# Patient Record
Sex: MALE | Race: WHITE | NOT HISPANIC OR LATINO | URBAN - METROPOLITAN AREA
[De-identification: names, ages, dates, MRNs, and addresses within clinical notes are randomized per-mention and may not be internally consistent; named-entity substitution may affect disease eponyms.]

---

## 2020-01-16 ENCOUNTER — PRE VISIT (OUTPATIENT)
Dept: UROLOGY | Facility: CLINIC | Age: 25
End: 2020-01-16

## 2020-01-16 NOTE — TELEPHONE ENCOUNTER
MEDICAL RECORDS REQUEST   [facility]  Urology Clinic  [address] [place] [address]  PHONE: [phone]  Fax: [phone]        FUTURE VISIT INFORMATION                                                   Sai Buckner, : 1995 scheduled for future visit at [facility]    APPOINTMENT INFORMATION:    Date: 20 9:30AM     Provider:  Avis Connors RN    Reason for Visit/Diagnosis: Discharge and comfort while urinating     REFERRAL INFORMATION:    Referring provider:  N/A    Specialty: N/A    Referring providers clinic:  [facility]     Clinic contact number:     RECORDS REQUESTED FOR VISIT                                                     NOTES  STATUS/DETAILS   OFFICE NOTE from referring provider  in process   OFFICE NOTE from other specialist  in process   DISCHARGE SUMMARY from hospital  in process   DISCHARGE REPORT from the ER  in process   OPERATIVE REPORT  in process   MEDICATION LIST  in process     PRE-VISIT CHECKLIST      Record collection complete If no, please explain: Called and LM for [facility] to have them fax recs ASAP  1:11PM   Appointment appropriately scheduled           (right time/right provider) Yes   MyChart activation Yes   Questionnaire complete If no, please explain: In process      Action Sonia  9:09AM - 2nd VM left for referring Clinic    Action Taken Called and left a detailed message for [facility], awaiting call back on medical records request.       10:22AM  [facility] called Luis Antonio, states they need an MI signed by the pt to release recs. Called and LM for pt to return my call ASAP for me to e-mail MI to gather his recs- awaiting call back and will inform provider about recs status      11:30AM  Pt returned my call, e-mailed MI ([email or web address])  Pt stated he will sign and return to me ASAP      12:55PM  Received signed MI- fax to  [facility] to obtain recs ASAP        Completed by: Sonia Hall

## 2020-01-16 NOTE — TELEPHONE ENCOUNTER
Reason for Visit: Consult    Diagnosis: Discharge and discomfort while urinating    Orders/Procedures/Records: Records available    Contact Patient: N/A    Rooming Requirements: Nico Pires  01/16/20  4:55 PM

## 2020-01-17 ASSESSMENT — ENCOUNTER SYMPTOMS
DYSURIA: 1
EYE REDNESS: 1

## 2020-01-18 ENCOUNTER — OFFICE VISIT (OUTPATIENT)
Dept: UROLOGY | Facility: CLINIC | Age: 25
End: 2020-01-18
Payer: COMMERCIAL

## 2020-01-18 ENCOUNTER — PRE VISIT (OUTPATIENT)
Dept: UROLOGY | Facility: CLINIC | Age: 25
End: 2020-01-18

## 2020-01-18 VITALS
HEIGHT: 74 IN | DIASTOLIC BLOOD PRESSURE: 82 MMHG | BODY MASS INDEX: 21.82 KG/M2 | WEIGHT: 170 LBS | SYSTOLIC BLOOD PRESSURE: 148 MMHG | HEART RATE: 84 BPM

## 2020-01-18 DIAGNOSIS — R36.9 PENILE DISCHARGE: Primary | ICD-10-CM

## 2020-01-18 DIAGNOSIS — R30.0 DYSURIA: ICD-10-CM

## 2020-01-18 LAB
ALBUMIN UR-MCNC: NEGATIVE MG/DL
APPEARANCE UR: CLEAR
BILIRUB UR QL STRIP: NEGATIVE
COLOR UR AUTO: ABNORMAL
GLUCOSE UR STRIP-MCNC: NEGATIVE MG/DL
GRAM STN SPEC: NORMAL
GRAM STN SPEC: NORMAL
HGB UR QL STRIP: NEGATIVE
KETONES UR STRIP-MCNC: NEGATIVE MG/DL
LEUKOCYTE ESTERASE UR QL STRIP: NEGATIVE
Lab: NORMAL
MUCOUS THREADS #/AREA URNS LPF: PRESENT /LPF
NITRATE UR QL: NEGATIVE
PH UR STRIP: 7 PH (ref 5–7)
RBC #/AREA URNS AUTO: <1 /HPF (ref 0–2)
SOURCE: ABNORMAL
SP GR UR STRIP: 1.01 (ref 1–1.03)
SPECIMEN SOURCE: NORMAL
UROBILINOGEN UR STRIP-MCNC: 0 MG/DL (ref 0–2)
WBC #/AREA URNS AUTO: 4 /HPF (ref 0–5)

## 2020-01-18 PROCEDURE — 87205 SMEAR GRAM STAIN: CPT | Performed by: NURSE PRACTITIONER

## 2020-01-18 PROCEDURE — 87109 MYCOPLASMA: CPT | Mod: 91 | Performed by: NURSE PRACTITIONER

## 2020-01-18 PROCEDURE — 87077 CULTURE AEROBIC IDENTIFY: CPT | Performed by: NURSE PRACTITIONER

## 2020-01-18 PROCEDURE — 87109 MYCOPLASMA: CPT | Performed by: NURSE PRACTITIONER

## 2020-01-18 PROCEDURE — 87086 URINE CULTURE/COLONY COUNT: CPT | Performed by: NURSE PRACTITIONER

## 2020-01-18 PROCEDURE — 87070 CULTURE OTHR SPECIMN AEROBIC: CPT | Mod: XS | Performed by: NURSE PRACTITIONER

## 2020-01-18 RX ORDER — EMTRICITABINE AND TENOFOVIR DISOPROXIL FUMARATE 200; 300 MG/1; MG/1
1 TABLET, FILM COATED ORAL DAILY
COMMUNITY

## 2020-01-18 ASSESSMENT — PAIN SCALES - GENERAL: PAINLEVEL: NO PAIN (0)

## 2020-01-18 ASSESSMENT — MIFFLIN-ST. JEOR: SCORE: 1835.86

## 2020-01-18 NOTE — PROGRESS NOTES
Chief Complaint:   Discharge and discomfort while voiding         History of Present Illness:    Sai Buckner is a very pleasant 24 year old male who presents for evaluation of the above. He was previously evaluated at [facility] for his symptoms, but these records are unfortunately unavailable. He states that in mid-December, he had an unprotected sexual encounter with two men. He developed urinary symptoms the following day, including penile discharge and discomfort with voiding and ejaculation. He underwent STI testing at , and all were negative. He was, however, treated with azithromycin 1 gm. His symptoms did not resolve, and retesting was done for STIs, but was again negative. He was treated with Rocephin at that repeat visit, along with a 7-day course of doxycycline, which he finished last Friday.     Today, he states that he continues to be symptomatic, although states that these symptoms have improved somewhat over time. However, they are bothersome, and he is hoping to find a cause. He denies any associated symptoms, including hematuria, hematospermia, or fevers.          Past Medical History:     Past Medical History:   Diagnosis Date     Penile discharge 12/16/2019            Past Surgical History:   History reviewed. No pertinent surgical history.         Medications     Current Outpatient Medications   Medication     emtricitabine-tenofovir (TRUVADA) 200-300 MG per tablet     No current facility-administered medications for this visit.             Family History:     Family History   Problem Relation Age of Onset     Hypertension Father      Hypertension Sister             Social History:     Social History     Socioeconomic History     Marital status: Single     Spouse name: Not on file     Number of children: Not on file     Years of education: Not on file     Highest education level: Not on file   Occupational History     Not on file   Social Needs     Financial resource strain:  "Not on file     Food insecurity:     Worry: Not on file     Inability: Not on file     Transportation needs:     Medical: Not on file     Non-medical: Not on file   Tobacco Use     Smoking status: Never Smoker     Smokeless tobacco: Never Used   Substance and Sexual Activity     Alcohol use: Yes     Drug use: Not Currently     Types: Marijuana     Comment: Occasional marijuana     Sexual activity: Not Currently     Partners: Male     Birth control/protection: Condom, None   Lifestyle     Physical activity:     Days per week: Not on file     Minutes per session: Not on file     Stress: Not on file   Relationships     Social connections:     Talks on phone: Not on file     Gets together: Not on file     Attends Confucianism service: Not on file     Active member of club or organization: Not on file     Attends meetings of clubs or organizations: Not on file     Relationship status: Not on file     Intimate partner violence:     Fear of current or ex partner: Not on file     Emotionally abused: Not on file     Physically abused: Not on file     Forced sexual activity: Not on file   Other Topics Concern     Parent/sibling w/ CABG, MI or angioplasty before 65F 55M? No   Social History Narrative     Not on file            Allergies:   Cats and Seasonal allergies         Review of Systems:  From intake questionnaire   Negative 14 system review except as noted on HPI, nurse's note.         Physical Exam:   Patient is a 24 year old  male   Vitals: Blood pressure (!) 148/82, pulse 84, height 1.88 m (6' 2\"), weight 77.1 kg (170 lb).  General Appearance Adult: Alert, no acute distress, oriented  Lungs: no respiratory distress, or pursed lip breathing  Heart: No obvious jugular venous distension present  Abdomen: soft, nontender, no organomegaly or masses, Body mass index is 21.83 kg/m .  : penis, scrotum, testes normal         Assessment and Plan:     Assessment: 24 year old male with penile discharge and discomfort with voiding " and ejaculating following an unprotected sexual encounter last month. STI testing x2 previously negative at [facility], although he has been treated with azithromycin, ceftriaxone, and doxycycline. Symptoms have improved somewhat over time, but continue to be present and bothersome. No associated symptoms, including hematuria, hematospermia, or fevers.     Plan:  -Will send urine for repeat analysis, culture and testing for mycoplasma and ureaplasma.  -Will also culture urethral discharge to identify a specific pathogen.   -If the above testing is negative, could consider monitoring symptoms vs. trialing an additional course of empiric antibiotics.       Avis Connors, CNP  Department of Urology

## 2020-01-18 NOTE — LETTER
1/18/2020       RE: Sai Buckner  [address]  [address]  [facility] [place] [address]     Dear Colleague,    Thank you for referring your patient, Sai Buckner, to the [facility] UROLOG[facility] AND [facility] FOR PROSTATE AND UROLOGIC CANCERS at [facility]. Please see a copy of my visit note below.            Chief Complaint:   Discharge and discomfort while voiding         History of Present Illness:    Sai Buckner is a very pleasant 24 year old male who presents for evaluation of the above. He was previously evaluated at [facility] for his symptoms, but these records are unfortunately unavailable. He states that in mid-December, he had an unprotected sexual encounter with two men. He developed urinary symptoms the following day, including penile discharge and discomfort with voiding and ejaculation. He underwent STI testing at , and all were negative. He was, however, treated with azithromycin 1 gm. His symptoms did not resolve, and retesting was done for STIs, but was again negative. He was treated with Rocephin at that repeat visit, along with a 7-day course of doxycycline, which he finished last Friday.     Today, he states that he continues to be symptomatic, although states that these symptoms have improved somewhat over time. However, they are bothersome, and he is hoping to find a cause. He denies any associated symptoms, including hematuria, hematospermia, or fevers.          Past Medical History:     Past Medical History:   Diagnosis Date     Penile discharge 12/16/2019            Past Surgical History:   History reviewed. No pertinent surgical history.         Medications     Current Outpatient Medications   Medication     emtricitabine-tenofovir (TRUVADA) 200-300 MG per tablet     No current facility-administered medications for this visit.             Family History:     Family History   Problem Relation Age of Onset     Hypertension Father      Hypertension Sister   "           Social History:     Social History     Socioeconomic History     Marital status: Single     Spouse name: Not on file     Number of children: Not on file     Years of education: Not on file     Highest education level: Not on file   Occupational History     Not on file   Social Needs     Financial resource strain: Not on file     Food insecurity:     Worry: Not on file     Inability: Not on file     Transportation needs:     Medical: Not on file     Non-medical: Not on file   Tobacco Use     Smoking status: Never Smoker     Smokeless tobacco: Never Used   Substance and Sexual Activity     Alcohol use: Yes     Drug use: Not Currently     Types: Marijuana     Comment: Occasional marijuana     Sexual activity: Not Currently     Partners: Male     Birth control/protection: Condom, None   Lifestyle     Physical activity:     Days per week: Not on file     Minutes per session: Not on file     Stress: Not on file   Relationships     Social connections:     Talks on phone: Not on file     Gets together: Not on file     Attends Zoroastrianism service: Not on file     Active member of club or organization: Not on file     Attends meetings of clubs or organizations: Not on file     Relationship status: Not on file     Intimate partner violence:     Fear of current or ex partner: Not on file     Emotionally abused: Not on file     Physically abused: Not on file     Forced sexual activity: Not on file   Other Topics Concern     Parent/sibling w/ CABG, MI or angioplasty before 65F 55M? No   Social History Narrative     Not on file            Allergies:   Cats and Seasonal allergies         Review of Systems:  From intake questionnaire   Negative 14 system review except as noted on HPI, nurse's note.         Physical Exam:   Patient is a 24 year old  male   Vitals: Blood pressure (!) 148/82, pulse 84, height 1.88 m (6' 2\"), weight 77.1 kg (170 lb).  General Appearance Adult: Alert, no acute distress, oriented  Lungs: no " respiratory distress, or pursed lip breathing  Heart: No obvious jugular venous distension present  Abdomen: soft, nontender, no organomegaly or masses, Body mass index is 21.83 kg/m .  : penis, scrotum, testes normal         Assessment and Plan:     Assessment: 24 year old male with penile discharge and discomfort with voiding and ejaculating following an unprotected sexual encounter last month. STI testing x2 previously negative at [facility], although he has been treated with azithromycin, ceftriaxone, and doxycycline. Symptoms have improved somewhat over time, but continue to be present and bothersome. No associated symptoms, including hematuria, hematospermia, or fevers.     Plan:  -Will send urine for repeat analysis, culture and testing for mycoplasma and ureaplasma.  -Will also culture urethral discharge to identify a specific pathogen.   -If the above testing is negative, could consider monitoring symptoms vs. trialing an additional course of empiric antibiotics.       Avis Connors, CNP  Department of Urology

## 2020-01-18 NOTE — PATIENT INSTRUCTIONS
UROLOGY CLINIC VISIT PATIENT INSTRUCTIONS    1) We will send your urine for repeat analysis, culture, and test for atypical infection from mycoplasma and ureaplasma.   2) We will also send the culture swab for testing to see if we are able to isolate a specific pathogen to guide treatment.   3) I will be in contact with you in the next several days once results become available.     If you have any issues, questions or concerns in the meantime, do not hesitate to contact us at [phone] or via [employer].     It was a pleasure meeting with you today.  Thank you for allowing me and my team the privilege of caring for you today.  YOU are the reason we are here, and I truly hope we provided you with the excellent service you deserve.  Please let us know if there is anything else we can do for you so that we can be sure you are leaving completely satisfied with your care experience.    Avis Connors, CNP

## 2020-01-18 NOTE — NURSING NOTE
"Chief Complaint   Patient presents with     Consult     Discharge and discomfort while urinating       Blood pressure (!) 148/82, pulse 84, height 1.88 m (6' 2\"), weight 77.1 kg (170 lb). Body mass index is 21.83 kg/m .    There is no problem list on file for this patient.      Allergies   Allergen Reactions     Cats      Seasonal Allergies        Current Outpatient Medications   Medication Sig Dispense Refill     emtricitabine-tenofovir (TRUVADA) 200-300 MG per tablet Take 1 tablet by mouth daily         Social History     Tobacco Use     Smoking status: Never Smoker     Smokeless tobacco: Never Used   Substance Use Topics     Alcohol use: Yes     Drug use: Not Currently     Types: Marijuana     Comment: Occasional marijuana       KAYKAY Hurst  1/18/2020  9:29 AM     "

## 2020-01-19 LAB
BACTERIA SPEC CULT: NO GROWTH
Lab: NORMAL
SPECIMEN SOURCE: NORMAL

## 2020-01-21 LAB
BACTERIA SPEC CULT: ABNORMAL
BACTERIA SPEC CULT: ABNORMAL
Lab: ABNORMAL
SPECIMEN SOURCE: ABNORMAL

## 2020-01-22 DIAGNOSIS — R30.0 DYSURIA: Primary | ICD-10-CM

## 2020-01-22 RX ORDER — CIPROFLOXACIN 500 MG/1
500 TABLET, FILM COATED ORAL 2 TIMES DAILY
Qty: 14 TABLET | Refills: 0 | Status: SHIPPED | OUTPATIENT
Start: 2020-01-22 | End: 2020-01-29

## 2020-01-26 LAB
BACTERIA SPEC CULT: NORMAL
BACTERIA SPEC CULT: NORMAL
SPECIMEN SOURCE: NORMAL
SPECIMEN SOURCE: NORMAL

## 2020-02-10 ENCOUNTER — MYC MEDICAL ADVICE (OUTPATIENT)
Dept: UROLOGY | Facility: CLINIC | Age: 25
End: 2020-02-10

## 2020-02-10 DIAGNOSIS — N34.2 URETHRITIS: Primary | ICD-10-CM

## 2020-02-10 RX ORDER — METRONIDAZOLE 500 MG/1
500 TABLET ORAL 2 TIMES DAILY
Qty: 14 TABLET | Refills: 0 | Status: SHIPPED | OUTPATIENT
Start: 2020-02-10 | End: 2020-02-17

## 2020-02-24 ENCOUNTER — TELEPHONE (OUTPATIENT)
Dept: UROLOGY | Facility: CLINIC | Age: 25
End: 2020-02-24

## 2020-02-24 NOTE — TELEPHONE ENCOUNTER
Will wait and see how things go and make appointment if need be   Cristel Leone LPN Staff Nurse  ----- Message from Avis Connors CNP sent at 2/24/2020  8:12 AM CST -----  Leonardo Fam-    I think he needs to continue monitoring his symptoms and give this a little more time. I would otherwise recommend follow up with Dr. Husain moving forward to consider alternative testing/treatments.    Thanks!    Avis Connors CNP  ----- Message -----  From: Cristel Leone LPN  Sent: 2/24/2020   8:07 AM CST  To: Avis Connors CNP    Patient called and talked about his symptoms again.  He is having just one small drop of discharge in the morning but feels this is not  normal  --sometimes sees discharge in pajamas  no pain at this time.  What next  Cristel Leone LPN Staff Nurse

## 2020-03-04 ENCOUNTER — TELEPHONE (OUTPATIENT)
Dept: UROLOGY | Facility: CLINIC | Age: 25
End: 2020-03-04

## 2020-03-04 NOTE — TELEPHONE ENCOUNTER
[facility] Call Center    Phone Message    May a detailed message be left on voicemail: yes     Reason for Call: Symptoms or Concerns     If patient has red-flag symptoms, warm transfer to triage line    Current symptom or concern: Penile discharge (severity level), burning during urination    Symptoms have been present for:  Last few day(s)       Are there any new or worsening symptoms? Yes: Pt stated the last few days has gotten worse      Action Taken: Message routed to:  [facility]    Travel Screening: Not Applicable

## 2020-03-05 ENCOUNTER — APPOINTMENT (OUTPATIENT)
Dept: LAB | Facility: CLINIC | Age: 25
End: 2020-03-05
Payer: COMMERCIAL

## 2020-03-05 ENCOUNTER — OFFICE VISIT (OUTPATIENT)
Dept: UROLOGY | Facility: CLINIC | Age: 25
End: 2020-03-05
Payer: COMMERCIAL

## 2020-03-05 VITALS
DIASTOLIC BLOOD PRESSURE: 91 MMHG | HEART RATE: 86 BPM | SYSTOLIC BLOOD PRESSURE: 147 MMHG | BODY MASS INDEX: 21.82 KG/M2 | WEIGHT: 170 LBS | HEIGHT: 74 IN

## 2020-03-05 DIAGNOSIS — R36.9 PENILE DISCHARGE: Primary | ICD-10-CM

## 2020-03-05 PROCEDURE — 87591 N.GONORRHOEAE DNA AMP PROB: CPT | Performed by: UROLOGY

## 2020-03-05 PROCEDURE — 87389 HIV-1 AG W/HIV-1&-2 AB AG IA: CPT | Performed by: UROLOGY

## 2020-03-05 PROCEDURE — 87491 CHLMYD TRACH DNA AMP PROBE: CPT | Performed by: UROLOGY

## 2020-03-05 PROCEDURE — 86780 TREPONEMA PALLIDUM: CPT | Performed by: UROLOGY

## 2020-03-05 PROCEDURE — 87086 URINE CULTURE/COLONY COUNT: CPT | Performed by: UROLOGY

## 2020-03-05 RX ORDER — AZITHROMYCIN 500 MG/1
500 TABLET, FILM COATED ORAL DAILY
Qty: 2 TABLET | Refills: 0 | Status: SHIPPED | OUTPATIENT
Start: 2020-03-05 | End: 2020-03-07

## 2020-03-05 RX ORDER — CEFTRIAXONE SODIUM 250 MG
250 VIAL (EA) INJECTION ONCE
Status: DISCONTINUED | OUTPATIENT
Start: 2020-03-05 | End: 2020-03-05 | Stop reason: CLARIF

## 2020-03-05 RX ORDER — CEFTRIAXONE SODIUM 250 MG
250 VIAL (EA) INJECTION ONCE
Status: COMPLETED | OUTPATIENT
Start: 2020-03-05 | End: 2020-03-05

## 2020-03-05 RX ADMIN — Medication 250 MG: at 11:50

## 2020-03-05 ASSESSMENT — MIFFLIN-ST. JEOR: SCORE: 1835.86

## 2020-03-05 ASSESSMENT — PAIN SCALES - GENERAL: PAINLEVEL: NO PAIN (0)

## 2020-03-05 NOTE — PROGRESS NOTES
We are pleased to see . Sai Buckner in consultation at the request of Avis Connors for the evaluation of chief complaint listed below    CC:    Penile discharge         History of Present Illness:   HPI: Sai Buckner is a(n) 24 year old MALE presenting to discuss penile discharge.     He was previously seen by Avis Connors CNP, for similar symptoms.    In December he had dysuria after unprotected sex. [facility] in January at [facility]. Had abx prescribed for both. He continued to have 2 symptoms: penile discharge and dysuria. These were mild until 3 days ago, at which time he started to have significantly more discharge and worsening dysuria. Has continued to have sexual encounters, usually with protection but 2 Saturdays ago had unprotected anal sex again. This partner is HIV positive but takes medication for this so his viral count is undetectable. Patient is also on Truvada. Has had anal sex with 2 partners since December. One was protected, another was not. The tip of his penis is also very sensitive. Urine not cloudy or malodorous. No urinary frequency. No blood in his urine. No fevers, chills, nausea, vomiting.            Past Medical History:     Past Medical History:   Diagnosis Date     Penile discharge 12/16/2019            Past Surgical History:   History reviewed. No pertinent surgical history.         Social History:   Works as an  at [employer].        Smoking: no tobacco  Alcohol: Social  IV Drug Use: None         Family History:     Family History   Problem Relation Age of Onset     Hypertension Father      Hypertension Sister      No urologic cancers in the family.         Allergies:     Allergies   Allergen Reactions     Cats      Seasonal Allergies             Medications:     Current Outpatient Medications   Medication Sig     emtricitabine-tenofovir (TRUVADA) 200-300 MG per tablet Take 1 tablet by mouth daily     No current  "facility-administered medications for this visit.             REVIEW OF SYSTEMS:    See HPI for pertinent details.  Remainder of 10-point ROS negative.         PHYSICAL EXAM   BP (!) 147/91   Pulse 86   Ht 1.88 m (6' 2\")   Wt 77.1 kg (170 lb)   BMI 21.83 kg/m    GENERAL: No acute distress. Well nourished.   HEENT:  Sclerae anicteric.  Conjunctivae pink.  Moist mucous membranes.  NECK:  Supple.  No lymphadenopathy.  CARDIAC:  Regular rate and rhythm.  LUNGS:  Non-labored breathing  BACK:  No costovertebral tenderness  ABDOMEN: Soft, non-tender, no surgical scars, no organomegaly, non-tender.  :  Phallus circumcised, meatus adequate, active mucopurulent discharge from urethral meatus present. Testes descended bilaterally, no intratesticular masses.  Epididymes non-tender.  No varicoceles or inguinal hernias.  EXTREMITIES:  Warm, well perfused.  No lower extremity edema bilaterally.  NEURO: normal gait, no focal deficits.           LABS AND IMAGING:   Recent STD panel negative          ASSESSMENT:   Sai Buckner is a 24 year old male who presents for evaluation of penile discharge. Based on sexual history it seems likely he has an STD. Has had previous rounds of negative STD panels however new partner with whom he had unprotected sexual intercourse with has unknown STD status and is HIV positive with unknown viral load. Symptoms worsened after this encounter. Suspect STD.          PLAN:   # Penile discharge, likely STD  - Repeat STD panel today  - 250 mg IM ceftriaxone in clinic today  - 1g azithromycin PO to be picked up at pharmacy  - RTC 2 weeks with repeat labs   - ID consult   - Follow up JORGITO Galvez MD  Urology Resident    Patient was seen and examined with Dr. Husain      I saw and examined the patient with the resident today.  I agree with the resident note and plan of care as above.   Gc/Chlam both came back positive.  Pt contacted to notify of diagnosis, advise safer sex, advised him to " notify sexual partners.    Shaw Husain MD  Urology Staff

## 2020-03-05 NOTE — LETTER
3/5/2020       RE: Sai Buckner  [address]  [address]  [place] [address]     Dear Colleague,    Thank you for referring your patient, Sai Buckner, to the [facility] UROLOGY AND [facility] FOR PROSTATE AND UROLOGIC CANCERS at [facility]. Please see a copy of my visit note below.    We are pleased to see . Sai Buckner in consultation at the request of Avis Connors for the evaluation of chief complaint listed below    CC:    Penile discharge         History of Present Illness:   HPI: Sai Buckner is a(n) 24 year old MALE presenting to discuss penile discharge.     He was previously seen by Avis Connors CNP, for similar symptoms.    In December he had dysuria after unprotected sex[facility] in January at [facility]. Had abx prescribed for both. He continued to have 2 symptoms: penile discharge and dysuria. These were mild until 3 days ago, at which time he started to have significantly more discharge and worsening dysuria. Has continued to have sexual encounters, usually with protection but 2 Saturdays ago had unprotected anal sex again. This partner is HIV positive but takes medication for this so his viral count is undetectable. Patient is also on Truvada. Has had anal sex with 2 partners since December. One was protected, another was not. The tip of his penis is also very sensitive. Urine not cloudy or malodorous. No urinary frequency. No blood in his urine. No fevers, chills, nausea, vomiting.            Past Medical History:     Past Medical History:   Diagnosis Date     Penile discharge 12/16/2019            Past Surgical History:   History reviewed. No pertinent surgical history.         Social History:   Works as an  at [employer].        Smoking: no tobacco  Alcohol: Social  IV Drug Use: None         Family History:     Family History   Problem Relation Age of Onset     Hypertension Father      Hypertension  "Sister      No urologic cancers in the family.         Allergies:     Allergies   Allergen Reactions     Cats      Seasonal Allergies             Medications:     Current Outpatient Medications   Medication Sig     emtricitabine-tenofovir (TRUVADA) 200-300 MG per tablet Take 1 tablet by mouth daily     No current facility-administered medications for this visit.             REVIEW OF SYSTEMS:    See HPI for pertinent details.  Remainder of 10-point ROS negative.         PHYSICAL EXAM   BP (!) 147/91   Pulse 86   Ht 1.88 m (6' 2\")   Wt 77.1 kg (170 lb)   BMI 21.83 kg/m    GENERAL: No acute distress. Well nourished.   HEENT:  Sclerae anicteric.  Conjunctivae pink.  Moist mucous membranes.  NECK:  Supple.  No lymphadenopathy.  CARDIAC:  Regular rate and rhythm.  LUNGS:  Non-labored breathing  BACK:  No costovertebral tenderness  ABDOMEN: Soft, non-tender, no surgical scars, no organomegaly, non-tender.  :  Phallus circumcised, meatus adequate, active mucopurulent discharge from urethral meatus present. Testes descended bilaterally, no intratesticular masses.  Epididymes non-tender.  No varicoceles or inguinal hernias.  EXTREMITIES:  Warm, well perfused.  No lower extremity edema bilaterally.  NEURO: normal gait, no focal deficits.           LABS AND IMAGING:   Recent STD panel negative          ASSESSMENT:   Sai Buckner is a 24 year old male who presents for evaluation of penile discharge. Based on sexual history it seems likely he has an STD. Has had previous rounds of negative STD panels however new partner with whom he had unprotected sexual intercourse with has unknown STD status and is HIV positive with unknown viral load. Symptoms worsened after this encounter. Suspect STD.          PLAN:   # Penile discharge, likely STD  - Repeat STD panel today  - 250 mg IM ceftriaxone in clinic today  - 1g azithromycin PO to be picked up at pharmacy  - RTC 2 weeks with repeat labs   - ID consult   - Follow up " JORGITO Galvez MD  Urology Resident    Patient was seen and examined with Dr. Husain      I saw and examined the patient with the resident today.  I agree with the resident note and plan of care as above.   Gc/Chlam both came back positive.  Pt contacted to notify of diagnosis, advise safer sex, advised him to notify sexual partners.    Shaw Husain MD  Urology Staff

## 2020-03-05 NOTE — NURSING NOTE
"Chief Complaint   Patient presents with     Consult     Penile discharge, referred from Avis Connors CNP       Blood pressure (!) 147/91, pulse 86, height 1.88 m (6' 2\"), weight 77.1 kg (170 lb). Body mass index is 21.83 kg/m .    There is no problem list on file for this patient.      Allergies   Allergen Reactions     Cats      Seasonal Allergies        Current Outpatient Medications   Medication Sig Dispense Refill     emtricitabine-tenofovir (TRUVADA) 200-300 MG per tablet Take 1 tablet by mouth daily         Social History     Tobacco Use     Smoking status: Never Smoker     Smokeless tobacco: Never Used   Substance Use Topics     Alcohol use: Yes     Drug use: Not Currently     Types: Marijuana     Comment: Occasional marijuana       Santa Bhatti LPN  3/5/2020  9:54 AM    The following medication was given:     MEDICATION:  Rocephin 250 mg and Lidocaine 0.9 mL  ROUTE: IM  SITE: Ventrogluteal - Right  DOSE: 250 mg  LOT #: [id number]  : [email or web address]  EXPIRATION DATE: 10/2021  NDC#: 3454-7683-87   Was there drug waste? No    Prior to injection, verified patient identity using patient's name and date of birth.  Due to injection administration, patient instructed to remain in clinic for 15 minutes  afterwards, and to report any adverse reaction to me immediately.    Drug Amount Wasted:  None.  Vial/Syringe: Single dose vial      The following medication was given:     MEDICATION:  Lidocaine without epinephrine 1%  ROUTE: IM (reconstitute Rocephin)  SITE: Ventrogluteal - Right  DOSE: 0.9 mL  LOT #: [id number]  : [employer]  EXPIRATION DATE: 06/2022  NDC#: [address]-75   Was there drug waste? Yes  Amount of drug waste (mL): 1.1 mL.  Reason for waste:  Single use vial    Prior to injection, verified patient identity using patient's name and date of birth.  Due to injection administration, patient instructed to remain in clinic for 15 minutes  afterwards, and to report any adverse reaction to me " immediately.    Drug Amount Wasted:  Yes: 1.1 mL   Vial/Syringe: Single dose vial    Santa Bhatti LPN  March 5, 2020  11:34 AM

## 2020-03-05 NOTE — PATIENT INSTRUCTIONS
Labs today.  Referral to Infectious Disease.  Follow up for repeat labs in a couple weeks.      It was a pleasure meeting with you today.  Thank you for allowing me and my team the privilege of caring for you today.  YOU are the reason we are here, and I truly hope we provided you with the excellent service you deserve.  Please let us know if there is anything else we can do for you so that we can be sure you are leaving completely satisfied with your care experience.

## 2020-03-06 ENCOUNTER — TELEPHONE (OUTPATIENT)
Dept: UROLOGY | Facility: CLINIC | Age: 25
End: 2020-03-06

## 2020-03-06 NOTE — TELEPHONE ENCOUNTER
Hi all,      Sandy from [facility] lab called stating that the patient's chlamydia and gonorrhea labs are positive. Please notify the patient with results and treatment plan.      Thanks, Steven Wisdom MA

## 2020-03-11 ENCOUNTER — HEALTH MAINTENANCE LETTER (OUTPATIENT)
Age: 25
End: 2020-03-11

## 2020-03-11 NOTE — TELEPHONE ENCOUNTER
Hi all,      FYI-Patient was notified that he needs to repeat his labs next week and have a consult with [facility]. Patient states that he found another to continue his care.       Steven Wisdom, [facility]

## 2020-03-11 NOTE — TELEPHONE ENCOUNTER
[facility]    Phone Message    May a detailed message be left on voicemail: yes     Reason for Call: Other: pt calling because he have not recieved the call to discuss next steps based apon his results. Please call the pt back to discuss next steps.     Action Taken: Message routed to:  [facility] urology    Travel Screening: Not Applicable

## 2020-08-02 ENCOUNTER — VIRTUAL VISIT (OUTPATIENT)
Dept: FAMILY MEDICINE | Facility: OTHER | Age: 25
End: 2020-08-02

## 2020-08-02 ENCOUNTER — AMBULATORY - HEALTHEAST (OUTPATIENT)
Dept: FAMILY MEDICINE | Facility: CLINIC | Age: 25
End: 2020-08-02

## 2020-08-02 DIAGNOSIS — Z20.822 SUSPECTED COVID-19 VIRUS INFECTION: ICD-10-CM

## 2020-08-02 NOTE — PROGRESS NOTES
"Date: 2020 07:28:06  Clinician: Shyanne Branch  Clinician NPI: [id number]  Patient: Sai Buckner  Patient : 1995  Patient Address: [address] [place] [address]  Patient Phone: [phone]  Visit Protocol: URI  Patient Summary:  Sai is a 25 year old ( : 1995 ) male who initiated a Visit for COVID-19 (Coronavirus) evaluation and screening. When asked the question \"Please sign me up to receive news, health information and promotions. \", Sai responded \"No\".    Sai states his symptoms started 1-2 days ago.   His symptoms consist of a sore throat, nasal congestion, and malaise.   Symptom details     Nasal secretions: The color of his mucus is green.    Sore throat: Sai reports having mild throat pain (1-3 on a 10 point pain scale), does not have exudate on his tonsils, and can swallow liquids. The lymph nodes in his neck are not enlarged. A rash has not appeared on the skin since the sore throat started.      Sai denies having wheezing, nausea, teeth pain, ageusia, diarrhea, myalgias, anosmia, facial pain or pressure, fever, cough, vomiting, rhinitis, ear pain, headache, chills, and enlarged lymph nodes. He also denies having recent facial or sinus surgery in the past 60 days and taking antibiotic medication in the past month. He is not experiencing dyspnea.   Precipitating events  Sai is not sure if he has been exposed to someone with strep throat.   Pertinent COVID-19 (Coronavirus) information  In the past 14 days, Sai has not worked in a congregate living setting.   He does not work or volunteer as healthcare worker or a  and does not work or volunteer in a healthcare facility.   Sai also has not lived in a congregate living setting in the past 14 days. He does not live with a healthcare worker.   Sai has not had a close contact with a laboratory-confirmed COVID-19 patient within 14 days of symptom onset.   Pertinent medical history  Sai needs a " "return to work/school note.   Weight: 170 lbs   Sai does not smoke or use smokeless tobacco.   Weight: 170 lbs    MEDICATIONS: Truvada oral, ALLERGIES: NKDA  Clinician Response:  Dear Sai,   Your symptoms show that you may have coronavirus (COVID-19). This illness can cause fever, cough and trouble breathing. Many people get a mild case and get better on their own. Some people can get very sick.  What should I do?  We would like to test you for this virus.   1. Please call [phone] to schedule your visit. Explain that [facility]-19 test. Be ready to share your [facility] visit ID number.  The following will serve as your written order for this COVID Test, ordered by me, for the indication of suspected COVID [Z20.828]: The test will be ordered in [employer], our electronic health record, after you are scheduled. It will show as ordered and authorized by Servando Madrid MD.  Order: COVID-19 (Coronavirus) PCR for SYMPTOMATIC testing from [facility].      2. When it's time for your COVID test:  Stay at least 6 feet away from others. (If someone will drive you to your test, stay in the backseat, as far away from the  as you can.)   Cover your mouth and nose with a mask, tissue or washcloth.  Go straight to the testing site. Don't make any stops on the way there or back.      3.Starting now: Stay home and away from others (self-isolate) until:   You've had no fever---and no medicine that reduces fever---for 3 full days (72 hours). And...   Your other symptoms have gotten better. For example, your cough or breathing has improved. And...   At least 10 days have passed since your symptoms started.       During this time, don't leave the house except for testing or medical care.   Stay in your own room, even for meals. Use your own bathroom if you can.   Stay away from others in your home. No hugging, kissing or shaking hands. No visitors.  Don't go to work, school or anywhere else.    Clean \"high " "touch\" surfaces often (doorknobs, counters, handles, etc.). Use a household cleaning spray or wipes. You'll find a full list of  on the EPA website: www.epa.gov/pesticide-registration/list-n-disinfectants-use-against-sars-cov-2.   Cover your mouth and nose with a mask, tissue or washcloth to avoid spreading germs.  Wash your hands and face often. Use soap and water.  Caregivers in these groups are at risk for severe illness due to COVID-19:  o People 65 years and older  o People who live in a nursing home or long-term care facility  o People with chronic disease (lung, heart, cancer, diabetes, kidney, liver, immunologic)  o People who have a weakened immune system, including those who:   Are in cancer treatment  Take medicine that weakens the immune system, such as corticosteroids  Had a bone marrow or organ transplant  Have an immune deficiency  Have poorly controlled HIV or AIDS  Are obese (body mass index of 40 or higher)  Smoke regularly   o Caregivers should wear gloves while washing dishes, handling laundry and cleaning bedrooms and bathrooms.  o Use caution when washing and drying laundry: Don't shake dirty laundry, and use the warmest water setting that you can.  o For more tips, go to www.cdc.gov/coronavirus/2019-ncov/downloads/10Things.pdf.    4.Sign up for [employer] We know it's scary to hear that you might have COVID-19. We want to track your symptoms to make sure you're okay over the next 2 weeks. Please look for an email from [employer]---this is a free, online program that we'll use to keep in touch. To sign up, follow the link in the email. Learn more at [email or web address]  How can I take care of myself?   Get lots of rest. Drink extra fluids (unless a doctor has told you not to).   Take Tylenol (acetaminophen) for fever or pain. If you have liver or kidney problems, ask your family doctor if it's okay to take Tylenol.   Adults can take either:    650 mg (two 325 mg " pills) every 4 to 6 hours, or...   1,000 mg (two 500 mg pills) every 8 hours as needed.    Note: Don't take more than 3,000 mg in one day. Acetaminophen is found in many medicines (both prescribed and over-the-counter medicines). Read all labels to be sure you don't take too much.   For children, check the Tylenol bottle for the right dose. The dose is based on the child's age or weight.    If you have other health problems (like cancer, heart failure, an organ transplant or severe kidney disease): Call your specialty clinic if you don't feel better in the next 2 days.       Know when to call 911. Emergency warning signs include:    Trouble breathing or shortness of breath Pain or pressure in the chest that doesn't go away Feeling confused like you haven't felt before, or not being able to wake up Bluish-colored lips or face.  Where can I get more information?   [facility] -- About COVID-19: [email or web address]/   CDC -- What to Do If You're Sick: www.cdc.gov/coronavirus/2019-ncov/about/steps-when-sick.html   CDC -- Ending Home Isolation: www.cdc.gov/coronavirus/2019-ncov/hcp/disposition-in-home-patients.html   CDC -- Caring for Someone: www.cdc.gov/coronavirus/2019-ncov/if-you-are-sick/care-for-someone.html   [facility] -- Interim Guidance for Hospital Discharge to Home: www.health.[facility].mn.us/diseases/coronavirus/hcp/hospdischarge.pdf   [facility] clinical trials (COVID-19 research studies): clinicalaffairs.[facility]/n-clinical-trials    Below are the COVID-19 hotlines at the [place] Department of Health ([facility]). Interpreters are available.    For health questions: Call 094-977-1704 or 1-614.152.6087 (7 a.m. to 7 p.m.) For questions about schools and childcare: Call 418-693-2719 or 1-500.484.1102 (7 a.m. to 7 p.m.)    Diagnosis: Cough  Diagnosis ICD: R05

## 2020-08-04 ENCOUNTER — COMMUNICATION - HEALTHEAST (OUTPATIENT)
Dept: SCHEDULING | Facility: CLINIC | Age: 25
End: 2020-08-04

## 2020-09-17 ENCOUNTER — VIRTUAL VISIT (OUTPATIENT)
Dept: FAMILY MEDICINE | Facility: OTHER | Age: 25
End: 2020-09-17

## 2020-09-17 DIAGNOSIS — Z20.822 SUSPECTED 2019 NOVEL CORONAVIRUS INFECTION: Primary | ICD-10-CM

## 2020-09-17 NOTE — PROGRESS NOTES
"Date: 2020 07:39:51  Clinician: Rajat Mccauley  Clinician NPI: [id number]  Patient: Sai Buckner  Patient : 1995  Patient Address: [address] [place] [address]  Patient Phone: [phone]  Visit Protocol: URI  Patient Summary:  Sai is a 25 year old ( : 1995 ) male who initiated a [facility] Visit for COVID-19 (Coronavirus) evaluation and screening. When asked the question \"Please sign me up to receive news, health information and promotions. \", Sai responded \"No\".    When asked when his symptoms started, Sai reported that he does not have any symptoms.   He denies taking antibiotic medication in the past month and having recent facial or sinus surgery in the past 60 days.    Pertinent COVID-19 (Coronavirus) information  In the past 14 days, Sai has not worked in a congregate living setting.   He does not work or volunteer as healthcare worker or a  and does not work or volunteer in a healthcare facility.   Sai also has not lived in a congregate living setting in the past 14 days. He does not live with a healthcare worker.   Sai has had a close contact with a laboratory-confirmed COVID-19 patient in the last 14 days. Additional information about contact with COVID-19 (Coronavirus) patient as reported by the patient (free text): The contact (my friend) started exhibiting symptoms on September 15.  I was then with the contact inside their home for several hours on , and they also got tested that day.  On the morning of , they received a laboratory-confirmed COVID-19 \"detected\" result.   Patient reported they are not living in the same household with a COVID-19 positive patient.  Patient was in an enclosed space for greater than 15 minutes with a COVID-19 patient.  Since 2019, Sai and has had upper respiratory infection (URI) or influenza-like illness. Has not been diagnosed with lab-confirmed COVID-19 test      Date(s) of " previous URI or influenza-like illness (free-text): Early August 2020.     Symptoms Sai experienced during previous URI or influenza-like illness as reported by the patient (free-text): Sore throat, fatigue, and congestion.        Pertinent medical history  Sai needs a return to work/school note.   Weight: 175 lbs   Sai does not smoke or use smokeless tobacco.   Additional information as reported by the patient (free text): To be clear, I am not currently exhibiting symptoms.   Weight: 175 lbs    MEDICATIONS: Truvada oral, ALLERGIES: NKDA  Clinician Response:  Dear Sai,   Based on your exposure to COVID-19 (coronavirus), we would like to test you for this virus.  1. Please call [phone] to schedule your visit. Explain that you were referred by [facility] to have a COVID-19 test. Be ready to share your [facility] visit ID number.  The following will serve as your written order for this COVID Test, ordered by me, for the indication of suspected COVID [Z20.828]: The test will be ordered in [employer], our electronic health record, after you are scheduled. It will show as ordered and authorized by Servando Madrid MD.  Order: COVID-19 (coronavirus) PCR for ASYMPTOMATIC EXPOSURE testing from [facility].  If you know you have had close contact with someone who tested positive, you should be quarantined for 14 days after this exposure. You should stay in quarantine for the14 days even if the covid test is negative, the optimal time to test after exposure is 5-7 days from the exposure  Quarantine means   What should I do?  For safety, it's very important to follow these rules. Do this for 14 days after the date you were last exposed to the virus..  Stay home and away from others. Don't go to school or anywhere else. Generally quarantine means staying home from work but there are some exceptions to this. Please contact your workplace.   No hugging, kissing or shaking hands.  Don't let anyone visit.  Cover your mouth and nose with a  mask, tissue or washcloth to avoid spreading germs.  Wash your hands and face often. Use soap and water.  What are the symptoms of COVID-19?  The most common symptoms are cough, fever and trouble breathing. Less common symptoms include headache, body aches, fatigue (feeling very tired), chills, sore throat, stuffy or runny nose, diarrhea (loose poop), loss of taste or smell, belly pain, and nausea or vomiting (feeling sick to your stomach or throwing up).  After 14 days, if you have still don't have symptoms, you likely don't have this virus.  If you develop symptoms, follow these guidelines.  If you're normally healthy: Please start another [facility] visit to report your symptoms. Go to [email or web address].  If you have a serious health problem (like cancer, heart failure, an organ transplant or kidney disease): Call your specialty clinic. Let them know that you might have COVID-19.  2. When it's time for your COVID test:  Stay at least 6 feet away from others. (If someone will drive you to your test, stay in the backseat, as far away from the  as you can.)  Cover your mouth and nose with a mask, tissue or washcloth.  Go straight to the testing site. Don't make any stops on the way there or back.  Please note  Caregivers in these groups are at risk for severe illness due to COVID-19:  o People 65 years and older  o People who live in a nursing home or long-term care facility  o People with chronic disease (lung, heart, cancer, diabetes, kidney, liver, immunologic)  o People who have a weakened immune system, including those who:  Are in cancer treatment  Take medicine that weakens the immune system, such as corticosteroids  Had a bone marrow or organ transplant  Have an immune deficiency  Have poorly controlled HIV or AIDS  Are obese (body mass index of 40 or higher)  Smoke regularly  Where can I get more information?   [employer] -- About COVID-19: [email or web address].[email or web address] [email or web address]  CDC -- What to Do If You're  Sick: www.cdc.gov/coronavirus/2019-ncov/about/steps-when-sick.html  CDC -- Ending Home Isolation: www.cdc.gov/coronavirus/2019-ncov/hcp/disposition-in-home-patients.html  [facility] -- Caring for Someone: www.cdc.gov/coronavirus/2019-ncov/if-you-are-sick/care-for-someone.html  [facility] -- Interim Guidance for Hospital Discharge to Home: www.[facility].mn./diseases/coronavirus/hcp/hospdischarge.pdf  [facility] clinical trials (COVID-19 research studies): clinicalaffairs.[facility]-clinical-trials  Below are the COVID-19 hotlines at the [place] Department of Health ([facility]). Interpreters are available.  For health questions: Call 853-806-4293 or 1-614.719.5220 (7 a.m. to 7 p.m.)  For questions about schools and childcare: Call 889-787-8633 or 1-307.755.9097 (7 a.m. to 7 p.m.)    Diagnosis: Contact with and (suspected) exposure to other viral communicable diseases  Diagnosis ICD: Z20.828

## 2020-09-19 DIAGNOSIS — Z20.822 SUSPECTED 2019 NOVEL CORONAVIRUS INFECTION: ICD-10-CM

## 2020-09-19 PROCEDURE — U0003 INFECTIOUS AGENT DETECTION BY NUCLEIC ACID (DNA OR RNA); SEVERE ACUTE RESPIRATORY SYNDROME CORONAVIRUS 2 (SARS-COV-2) (CORONAVIRUS DISEASE [COVID-19]), AMPLIFIED PROBE TECHNIQUE, MAKING USE OF HIGH THROUGHPUT TECHNOLOGIES AS DESCRIBED BY CMS-2020-01-R: HCPCS | Performed by: FAMILY MEDICINE

## 2020-09-20 LAB
SARS-COV-2 RNA SPEC QL NAA+PROBE: NOT DETECTED
SPECIMEN SOURCE: NORMAL

## 2020-11-27 ENCOUNTER — VIRTUAL VISIT (OUTPATIENT)
Dept: FAMILY MEDICINE | Facility: OTHER | Age: 25
End: 2020-11-27

## 2020-11-27 NOTE — PROGRESS NOTES
"Date: 2020 15:01:41  Clinician: Calos Portillo  Clinician NPI: [id number]  Patient: Sai Buckner  Patient : 1995  Patient Address: [address] [place] [address]  Patient Phone: [phone]  Visit Protocol: URI  Patient Summary:  Sai is a 25 year old ( : 1995 ) male who initiated a [facility] Visit for COVID-19 (Coronavirus) evaluation and screening. When asked the question \"Please sign me up to receive news, health information and promotions. \", Sai responded \"No\".    Sai states his symptoms started 1-2 days ago.   His symptoms consist of malaise and a sore throat.   Symptom details   Sore throat: Sai reports having mild throat pain (1-3 on a 10 point pain scale), does not have exudate on his tonsils, and can swallow liquids. The lymph nodes in his neck are not enlarged. A rash has not appeared on the skin since the sore throat started.    Sai denies having ear pain, headache, wheezing, fever, enlarged lymph nodes, cough, nasal congestion, nausea, vomiting, rhinitis, facial pain or pressure, myalgias, chills, teeth pain, ageusia, diarrhea, and anosmia. He also denies taking antibiotic medication in the past month and having recent facial or sinus surgery in the past 60 days. He is not experiencing dyspnea.   Precipitating events  Within the past week, Sai has not been exposed to someone with strep throat.   Pertinent COVID-19 (Coronavirus) information  Sai does not work or volunteer as healthcare worker or a . In the past 14 days, Sai has not worked or volunteered at a healthcare facility or group living setting.   In the past 14 days, he also has not lived in a congregate living setting.   Sai has not had a close contact with a laboratory-confirmed COVID-19 patient within 14 days of symptom onset.    Since 2019, Sai has been tested for COVID-19 and has not had upper respiratory infection or influenza-like illness.      Result of COVID-19 " test: Negative     Date of his COVID-19 test: 09/19/2020      Pertinent medical history  He has not been told by his provider to avoid NSAIDs.   Sai does not have diabetes. He denies having immunosuppressive conditions (e.g., chemotherapy, HIV, organ transplant, long-term use of steroids or other immunosuppressive medications, splenectomy). He does not have severe COPD and congestive heart failure. He does not have asthma.   Sai needs a return to work/school note.   Weight: 175 lbs   Sai does not smoke or use smokeless tobacco.   Weight: 175 lbs    MEDICATIONS: terbinafine HCl oral, Truvada oral, ALLERGIES: NKDA  Clinician Response:  Dear Sai,   Your symptoms show that you may have coronavirus (COVID-19). This illness can cause fever, cough and trouble breathing. Many people get a mild case and get better on their own. Some people can get very sick.  Because you also reported sore throat I would like to also test you for Strep Throat to determine if we need to treat you for that as well.  What should I do?  We would like to test you for the COVID-19 virus and the streptococcus bacteria.   1. Please call [phone] to schedule your visit. Explain that you were referred by [facility] to have a COVID-19 test and a Strep test. Be ready to share your [facility] visit ID number.  * If you need to schedule in [facility] please call [phone] or for [employer] employees please call [phone]  The following will serve as your written order for the COVID Test, ordered by me, for the indication of suspected COVID [Z20.828]: The test will be ordered in [email or web address], our electronic health record, after you are scheduled. It will show as ordered and authorized by Servando Madrid MD.  Order: COVID-19 (Coronavirus) PCR for SYMPTOMATIC testing from [facility].  The following will serve as your written order for this Strep Test, ordered by me, for the indication of suspected Strep throat [R07.0]: The test will be ordered in Epic, our  "electronic health record, after you are scheduled. It will show as ordered and authorized by Servando Madrid MD.  Order: Streptococcus A Rapid Screen with reflex to PCR testing from [facility].  2. When it's time for your COVID and Strep test:  Stay at least 6 feet away from others. (If someone will drive you to your test, stay in the backseat, as far away from the  as you can.)  Cover your mouth and nose with a mask, tissue or washcloth.  Go straight to the testing site. Don't make any stops on the way there or back.  3.Starting now: Stay home and away from others (self-isolate) until:  You've had no fever---and no medicine that reduces fever---for one full day (24 hours). And...  Your other symptoms have gotten better. For example, your cough or breathing has improved. And...  At least 10 days have passed since your symptoms started.  During this time, don't leave the house except for testing or medical care.  Stay in your own room, even for meals. Use your own bathroom if you can.  Stay away from others in your home. No hugging, kissing or shaking hands. No visitors.  Don't go to work, school or anywhere else.  Clean \"high touch\" surfaces often (doorknobs, counters, handles, etc.). Use a household cleaning spray or wipes. You'll find a full list of  on the EPA website: www.epa.gov/pesticide-registration/list-n-disinfectants-use-against-sars-cov-2.  Cover your mouth and nose with a mask, tissue or washcloth to avoid spreading germs.  Wash your hands and face often. Use soap and water.  Caregivers in these groups are at risk for severe illness due to COVID-19:  o People 65 years and older  o People who live in a nursing home or long-term care facility  o People with chronic disease (lung, heart, cancer, diabetes, kidney, liver, immunologic)  o People who have a weakened immune system, including those who:  Are in cancer treatment  Take medicine that weakens the immune system, such as corticosteroids  Had a " bone marrow or organ transplant  Have an immune deficiency  Have poorly controlled HIV or AIDS  Are obese (body mass index of 40 or higher)  Smoke regularly  o Caregivers should wear gloves while washing dishes, handling laundry and cleaning bedrooms and bathrooms.  o Use caution when washing and drying laundry: Don't shake dirty laundry, and use the warmest water setting that you can.  o For more tips, go to www.cdc.gov/coronavirus/2019-ncov/downloads/10Things.pdf.  4.Sign up for [employer]. We know it's scary to hear that you might have COVID-19. We want to track your symptoms to make sure you're okay over the next 2 weeks. Please look for an email from [employer]---this is a free, online program that we'll use to keep in touch. To sign up, follow the link in the email. Learn more at [email or web address]  How can I take care of myself?  Get lots of rest. Drink extra fluids (unless a doctor has told you not to).  Take Tylenol (acetaminophen) for fever or pain. If you have liver or kidney problems, ask your family doctor if it's okay to take Tylenol.  Adults can take either:  650 mg (two 325 mg pills) every 4 to 6 hours, or...  1,000 mg (two 500 mg pills) every 8 hours as needed.  Note: Don't take more than 3,000 mg in one day. Acetaminophen is found in many medicines (both prescribed and over-the-counter medicines). Read all labels to be sure you don't take too much.  For children, check the Tylenol bottle for the right dose. The dose is based on the child's age or weight.  If you have other health problems (like cancer, heart failure, an organ transplant or severe kidney disease): Call your specialty clinic if you don't feel better in the next 2 days.  Know when to call 911. Emergency warning signs include:  Trouble breathing or shortness of breath  Pain or pressure in the chest that doesn't go away  Feeling confused like you haven't felt before, or not being able to wake up  Bluish-colored lips  or face.  Where can I get more information?  [facility] -- About COVID-19: [email or web address]  CDC -- What to Do If You're Sick: www.cdc.gov/coronavirus/2019-ncov/about/steps-when-sick.html  [facility] -- Ending Home Isolation: www.cdc.gov/coronavirus/2019-ncov/hcp/disposition-in-home-patients.html  [facility] -- Caring for Someone: www.cdc.gov/coronavirus/2019-ncov/if-you-are-sick/care-for-someone.html  [facility] -- Interim Guidance for Hospital Discharge to Home: www.[facility].[place]/diseases/coronavirus/hcp/hospdischarge.pdf  [facility] clinical trials (COVID-19 research studies): clinicalaffairs.[facility]-clinical-trials  Below are the COVID-19 hotlines at the [place] [facility] ([facility]). Interpreters are available.  For health questions: Call 886-277-1504 or 1-785.855.7619 (7 a.m. to 7 p.m.)  For questions about schools and childcare: Call 286-052-6475 or 1-493.207.4441 (7 a.m. to 7 p.m.)       Diagnosis: Acute pharyngitis, unspecified  Diagnosis ICD: J02.9

## 2020-11-28 DIAGNOSIS — Z20.822 SUSPECTED COVID-19 VIRUS INFECTION: Primary | ICD-10-CM

## 2020-11-28 DIAGNOSIS — R07.0 THROAT PAIN: ICD-10-CM

## 2020-11-29 DIAGNOSIS — Z20.822 SUSPECTED COVID-19 VIRUS INFECTION: ICD-10-CM

## 2020-11-29 DIAGNOSIS — R36.9 PENILE DISCHARGE: ICD-10-CM

## 2020-11-29 DIAGNOSIS — R07.0 THROAT PAIN: ICD-10-CM

## 2020-11-29 LAB
DEPRECATED S PYO AG THROAT QL EIA: NEGATIVE
SPECIMEN SOURCE: NORMAL
SPECIMEN SOURCE: NORMAL
STREP GROUP A PCR: NOT DETECTED

## 2020-11-29 PROCEDURE — U0003 INFECTIOUS AGENT DETECTION BY NUCLEIC ACID (DNA OR RNA); SEVERE ACUTE RESPIRATORY SYNDROME CORONAVIRUS 2 (SARS-COV-2) (CORONAVIRUS DISEASE [COVID-19]), AMPLIFIED PROBE TECHNIQUE, MAKING USE OF HIGH THROUGHPUT TECHNOLOGIES AS DESCRIBED BY CMS-2020-01-R: HCPCS | Performed by: FAMILY MEDICINE

## 2020-11-29 PROCEDURE — 87651 STREP A DNA AMP PROBE: CPT | Performed by: FAMILY MEDICINE

## 2020-11-29 PROCEDURE — 99N1174 PR STATISTIC STREP A RAPID: Performed by: FAMILY MEDICINE

## 2020-11-30 LAB
SARS-COV-2 RNA SPEC QL NAA+PROBE: NOT DETECTED
SPECIMEN SOURCE: NORMAL

## 2021-04-25 ENCOUNTER — HEALTH MAINTENANCE LETTER (OUTPATIENT)
Age: 26
End: 2021-04-25

## 2021-10-10 ENCOUNTER — HEALTH MAINTENANCE LETTER (OUTPATIENT)
Age: 26
End: 2021-10-10

## 2022-05-22 ENCOUNTER — HEALTH MAINTENANCE LETTER (OUTPATIENT)
Age: 27
End: 2022-05-22

## 2022-09-18 ENCOUNTER — HEALTH MAINTENANCE LETTER (OUTPATIENT)
Age: 27
End: 2022-09-18

## 2023-06-04 ENCOUNTER — HEALTH MAINTENANCE LETTER (OUTPATIENT)
Age: 28
End: 2023-06-04

## (undated) RX ORDER — CEFTRIAXONE SODIUM 250 MG/1
INJECTION, POWDER, FOR SOLUTION INTRAMUSCULAR; INTRAVENOUS
Status: DISPENSED
Start: 2020-03-05

## (undated) RX ORDER — LIDOCAINE HYDROCHLORIDE 10 MG/ML
INJECTION, SOLUTION EPIDURAL; INFILTRATION; INTRACAUDAL; PERINEURAL
Status: DISPENSED
Start: 2020-03-05